# Patient Record
Sex: FEMALE | Race: WHITE | NOT HISPANIC OR LATINO
[De-identification: names, ages, dates, MRNs, and addresses within clinical notes are randomized per-mention and may not be internally consistent; named-entity substitution may affect disease eponyms.]

---

## 2021-03-30 PROBLEM — Z00.00 ENCOUNTER FOR PREVENTIVE HEALTH EXAMINATION: Status: ACTIVE | Noted: 2021-03-30

## 2021-03-31 ENCOUNTER — APPOINTMENT (OUTPATIENT)
Dept: MRI IMAGING | Facility: CLINIC | Age: 56
End: 2021-03-31

## 2021-05-06 ENCOUNTER — RESULT REVIEW (OUTPATIENT)
Age: 56
End: 2021-05-06

## 2023-01-17 ENCOUNTER — NON-APPOINTMENT (OUTPATIENT)
Age: 58
End: 2023-01-17

## 2023-01-17 ENCOUNTER — APPOINTMENT (OUTPATIENT)
Dept: OTOLARYNGOLOGY | Facility: CLINIC | Age: 58
End: 2023-01-17
Payer: COMMERCIAL

## 2023-01-17 VITALS
WEIGHT: 130 LBS | SYSTOLIC BLOOD PRESSURE: 108 MMHG | BODY MASS INDEX: 22.75 KG/M2 | DIASTOLIC BLOOD PRESSURE: 74 MMHG | HEIGHT: 63.5 IN | HEART RATE: 84 BPM | TEMPERATURE: 97.9 F

## 2023-01-17 PROCEDURE — 99204 OFFICE O/P NEW MOD 45 MIN: CPT

## 2023-01-18 NOTE — PHYSICAL EXAM
[Midline] : trachea located in midline position [Normal] : no rashes [de-identified] : Expansile lesion of the right mandible palpable no mobile teeth no active infection no ulceration or soft tissue involvement visible

## 2023-01-18 NOTE — HISTORY OF PRESENT ILLNESS
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 Mulligan's presenting for surgical consultation. Patient has history of thyroid nodules as well and is also due for new imaging.  Patient states she is known about this right-sided ameloblastoma for some time and seen multiple consultations both here in the [place] and in [place] regarding this she states the lesion is 2 mm she did show me a 3D CT scan which shows expansion of the lateral mandible which appears slightly larger than that she has not had recent imaging to determine the rate of growth over the time.  She had some delay of her care due to being busy with her son and the pandemic according to the patient.  At this point she would like to reestablish care and have this tumor removed. \par \par -had episode when her jaw locked open, has not occurred since\par -has intermittent  numbness, swelling, drooling particularly on right side

## 2023-01-20 ENCOUNTER — OUTPATIENT (OUTPATIENT)
Dept: OUTPATIENT SERVICES | Facility: HOSPITAL | Age: 58
LOS: 1 days | End: 2023-01-20
Payer: COMMERCIAL

## 2023-01-20 ENCOUNTER — APPOINTMENT (OUTPATIENT)
Dept: ULTRASOUND IMAGING | Facility: HOSPITAL | Age: 58
End: 2023-01-20
Payer: COMMERCIAL

## 2023-01-20 PROCEDURE — 76536 US EXAM OF HEAD AND NECK: CPT

## 2023-01-20 PROCEDURE — 76536 US EXAM OF HEAD AND NECK: CPT | Mod: 26

## 2023-01-26 ENCOUNTER — APPOINTMENT (OUTPATIENT)
Dept: CT IMAGING | Facility: HOSPITAL | Age: 58
End: 2023-01-26
Payer: COMMERCIAL

## 2023-01-26 ENCOUNTER — OUTPATIENT (OUTPATIENT)
Dept: OUTPATIENT SERVICES | Facility: HOSPITAL | Age: 58
LOS: 1 days | End: 2023-01-26
Payer: COMMERCIAL

## 2023-01-26 PROCEDURE — 70486 CT MAXILLOFACIAL W/O DYE: CPT | Mod: 26

## 2023-01-26 PROCEDURE — 73706 CT ANGIO LWR EXTR W/O&W/DYE: CPT | Mod: 26,50

## 2023-01-26 PROCEDURE — 73706 CT ANGIO LWR EXTR W/O&W/DYE: CPT

## 2023-01-26 PROCEDURE — 70486 CT MAXILLOFACIAL W/O DYE: CPT

## 2023-02-01 DIAGNOSIS — K42.9 UMBILICAL HERNIA WITHOUT OBSTRUCTION OR GANGRENE: ICD-10-CM

## 2023-02-01 DIAGNOSIS — N85.8 OTHER SPECIFIED NONINFLAMMATORY DISORDERS OF UTERUS: ICD-10-CM

## 2023-02-01 DIAGNOSIS — Z01.818 ENCOUNTER FOR OTHER PREPROCEDURAL EXAMINATION: ICD-10-CM

## 2023-02-01 DIAGNOSIS — D16.5 BENIGN NEOPLASM OF LOWER JAW BONE: ICD-10-CM

## 2023-03-16 ENCOUNTER — APPOINTMENT (OUTPATIENT)
Dept: OTOLARYNGOLOGY | Facility: CLINIC | Age: 58
End: 2023-03-16
Payer: COMMERCIAL

## 2023-03-16 VITALS
WEIGHT: 140 LBS | BODY MASS INDEX: 23.9 KG/M2 | OXYGEN SATURATION: 97 % | SYSTOLIC BLOOD PRESSURE: 104 MMHG | TEMPERATURE: 97.9 F | HEART RATE: 107 BPM | HEIGHT: 64 IN | DIASTOLIC BLOOD PRESSURE: 66 MMHG

## 2023-03-16 DIAGNOSIS — R68.89 OTHER GENERAL SYMPTOMS AND SIGNS: ICD-10-CM

## 2023-03-16 DIAGNOSIS — H92.01 OTALGIA, RIGHT EAR: ICD-10-CM

## 2023-03-16 PROCEDURE — 92550 TYMPANOMETRY & REFLEX THRESH: CPT | Mod: 52

## 2023-03-16 PROCEDURE — 99213 OFFICE O/P EST LOW 20 MIN: CPT

## 2023-03-16 PROCEDURE — 92557 COMPREHENSIVE HEARING TEST: CPT

## 2023-03-16 NOTE — ASSESSMENT
[FreeTextEntry1] : 1. concern for hearing loss\par - showed b nl symmetric hearing, b nl tympanograms -results reviewed with pt \par -CAP test\par 2. r otalgia is likely d/t TMJ\par -explained ameloblastoma might be affecting this\par -aleve BID x 10 days if pt can tolerate\par -soft diet\par -avoid bruxism and chewing gum\par -recommended discussing this with Dr. Sung\par -reassured swelling in her neck she noticed was l salivary gland and this was nl\par RTC with CAP test \par if vertigo recurs, will eval - it was one short-lived occurrence

## 2023-03-16 NOTE — REASON FOR VISIT
[Initial Evaluation] : an initial evaluation for [FreeTextEntry2] : hearing loss, dizziness, r ear pain

## 2023-03-16 NOTE — HISTORY OF PRESENT ILLNESS
[de-identified] : 56 y/o F is presenting with r hearing loss for the past month. She initially thought her headphones were broken, but when she tried her other headphone she noticed it was her ear. She can hear partially, but feels it has been off. She denies ear drainage. In the past month she has been on several plane flights. She is c/o an episode of dizziness and weakness which lasted for a few minutes a week ago. She has h/o ameloblastoma and thyroid nodules and is seeing Dr. Sung for this. She noticed slight swelling in her l neck about 1 week ago. She is c/o r ear pain. She had been diagnosed with TMJ in the past.

## 2023-03-20 ENCOUNTER — NON-APPOINTMENT (OUTPATIENT)
Age: 58
End: 2023-03-20

## 2023-03-30 ENCOUNTER — APPOINTMENT (OUTPATIENT)
Dept: OTOLARYNGOLOGY | Facility: CLINIC | Age: 58
End: 2023-03-30
Payer: COMMERCIAL

## 2023-03-30 VITALS
WEIGHT: 140 LBS | HEIGHT: 64 IN | SYSTOLIC BLOOD PRESSURE: 108 MMHG | OXYGEN SATURATION: 99 % | BODY MASS INDEX: 23.9 KG/M2 | DIASTOLIC BLOOD PRESSURE: 79 MMHG | TEMPERATURE: 98 F | HEART RATE: 70 BPM

## 2023-03-30 DIAGNOSIS — H93.25 CENTRAL AUDITORY PROCESSING DISORDER: ICD-10-CM

## 2023-03-30 DIAGNOSIS — H91.90 UNSPECIFIED HEARING LOSS, UNSPECIFIED EAR: ICD-10-CM

## 2023-03-30 PROCEDURE — 99213 OFFICE O/P EST LOW 20 MIN: CPT

## 2023-03-30 PROCEDURE — 92620 AUDITORY FUNCTION 60 MIN: CPT

## 2023-03-30 NOTE — ASSESSMENT
[FreeTextEntry1] : concern for hearing loss likely d/t central auditory processing disorder\par -CAP revealed several abnl findings -results reviewed with pt \par -recommended she followup with her neurologist as this is a central process\par -recommended auditory rehab- brought to audiology to get referral\par RTC as needed

## 2023-03-30 NOTE — DATA REVIEWED
[de-identified] : CAP revealed several abnl findings -results reviewed with pt  [de-identified] : she showed me nl mri report as compared to prior, done recently at [facility], + and - pj, 1 small unchanged t2 abn. Asst asked to get a copy.

## 2023-03-30 NOTE — HISTORY OF PRESENT ILLNESS
[de-identified] : 2 week followup visit for this 56 y/o F with r hearing loss. She initially had trouble hearing out of her headphones. She had  at last visit which revealed b nl symmetric hearing and b nl tympanograms, so CAP was ordered to see if there could be a central cause. She is here to review CAP test. She has family history of an aneurysm and has seen a neurologist in the past. She had an instance last year where she was at a sporting event and she noticed her hearing completely "bottomed out" briefly and she felt like she was having a stroke. She has not mentioned this to her neurologist.

## 2023-06-06 ENCOUNTER — APPOINTMENT (OUTPATIENT)
Dept: OTOLARYNGOLOGY | Facility: CLINIC | Age: 58
End: 2023-06-06
Payer: COMMERCIAL

## 2023-06-06 VITALS
BODY MASS INDEX: 22.2 KG/M2 | SYSTOLIC BLOOD PRESSURE: 110 MMHG | HEART RATE: 91 BPM | HEIGHT: 64 IN | DIASTOLIC BLOOD PRESSURE: 72 MMHG | WEIGHT: 130 LBS

## 2023-06-06 DIAGNOSIS — D16.5 BENIGN NEOPLASM OF LOWER JAW BONE: ICD-10-CM

## 2023-06-06 DIAGNOSIS — E04.1 NONTOXIC SINGLE THYROID NODULE: ICD-10-CM

## 2023-06-06 PROCEDURE — 99213 OFFICE O/P EST LOW 20 MIN: CPT

## 2023-06-06 NOTE — HISTORY OF PRESENT ILLNESS
[de-identified] : 57F with history of ameloblastoma here for follow up visit to discuss her case. She completed a CT scan of maxillofacial region as well as lower extremity CT. She reports no new symptoms in terms of her jaw. She states that she essentially does not feel this lesion and if she does have any symptoms they are infrequent.  US of thyroid also completed which revealed 2 subcentimeter nodules which did not meet criteria for FNA.  She is denying any changes in her voice, dysphagia or pain with swallowing, breathing issues, or neck pain at this time.

## 2023-06-23 ENCOUNTER — TRANSCRIPTION ENCOUNTER (OUTPATIENT)
Age: 58
End: 2023-06-23